# Patient Record
Sex: MALE | Race: WHITE | NOT HISPANIC OR LATINO | URBAN - METROPOLITAN AREA
[De-identification: names, ages, dates, MRNs, and addresses within clinical notes are randomized per-mention and may not be internally consistent; named-entity substitution may affect disease eponyms.]

---

## 2019-09-17 ENCOUNTER — EMERGENCY (EMERGENCY)
Facility: HOSPITAL | Age: 64
LOS: 1 days | Discharge: HOME | End: 2019-09-17
Attending: EMERGENCY MEDICINE | Admitting: EMERGENCY MEDICINE
Payer: COMMERCIAL

## 2019-09-17 VITALS
HEART RATE: 84 BPM | WEIGHT: 265 LBS | SYSTOLIC BLOOD PRESSURE: 166 MMHG | OXYGEN SATURATION: 98 % | RESPIRATION RATE: 20 BRPM | TEMPERATURE: 99 F | DIASTOLIC BLOOD PRESSURE: 93 MMHG

## 2019-09-17 VITALS
DIASTOLIC BLOOD PRESSURE: 86 MMHG | OXYGEN SATURATION: 99 % | HEART RATE: 85 BPM | SYSTOLIC BLOOD PRESSURE: 147 MMHG | TEMPERATURE: 98 F | RESPIRATION RATE: 16 BRPM

## 2019-09-17 DIAGNOSIS — Z98.890 OTHER SPECIFIED POSTPROCEDURAL STATES: Chronic | ICD-10-CM

## 2019-09-17 LAB
ALBUMIN SERPL ELPH-MCNC: 3.6 G/DL — SIGNIFICANT CHANGE UP (ref 3.3–5)
ALP SERPL-CCNC: 70 U/L — SIGNIFICANT CHANGE UP (ref 40–120)
ALT FLD-CCNC: 29 U/L — SIGNIFICANT CHANGE UP (ref 12–78)
ANION GAP SERPL CALC-SCNC: 5 MMOL/L — SIGNIFICANT CHANGE UP (ref 5–17)
APTT BLD: 36 SEC — SIGNIFICANT CHANGE UP (ref 28.5–37)
AST SERPL-CCNC: 18 U/L — SIGNIFICANT CHANGE UP (ref 15–37)
BASOPHILS # BLD AUTO: 0.02 K/UL — SIGNIFICANT CHANGE UP (ref 0–0.2)
BASOPHILS NFR BLD AUTO: 0.3 % — SIGNIFICANT CHANGE UP (ref 0–2)
BILIRUB SERPL-MCNC: 0.3 MG/DL — SIGNIFICANT CHANGE UP (ref 0.2–1.2)
BUN SERPL-MCNC: 17 MG/DL — SIGNIFICANT CHANGE UP (ref 7–23)
CALCIUM SERPL-MCNC: 9.6 MG/DL — SIGNIFICANT CHANGE UP (ref 8.5–10.1)
CHLORIDE SERPL-SCNC: 108 MMOL/L — SIGNIFICANT CHANGE UP (ref 96–108)
CO2 SERPL-SCNC: 30 MMOL/L — SIGNIFICANT CHANGE UP (ref 22–31)
CREAT SERPL-MCNC: 1.1 MG/DL — SIGNIFICANT CHANGE UP (ref 0.5–1.3)
EOSINOPHIL # BLD AUTO: 0.08 K/UL — SIGNIFICANT CHANGE UP (ref 0–0.5)
EOSINOPHIL NFR BLD AUTO: 1.1 % — SIGNIFICANT CHANGE UP (ref 0–6)
GLUCOSE SERPL-MCNC: 101 MG/DL — HIGH (ref 70–99)
HCT VFR BLD CALC: 45.9 % — SIGNIFICANT CHANGE UP (ref 39–50)
HGB BLD-MCNC: 15.6 G/DL — SIGNIFICANT CHANGE UP (ref 13–17)
IMM GRANULOCYTES NFR BLD AUTO: 0.4 % — SIGNIFICANT CHANGE UP (ref 0–1.5)
INR BLD: 1.09 RATIO — SIGNIFICANT CHANGE UP (ref 0.88–1.16)
LIDOCAIN IGE QN: 68 U/L — LOW (ref 73–393)
LYMPHOCYTES # BLD AUTO: 1.52 K/UL — SIGNIFICANT CHANGE UP (ref 1–3.3)
LYMPHOCYTES # BLD AUTO: 20.3 % — SIGNIFICANT CHANGE UP (ref 13–44)
MCHC RBC-ENTMCNC: 29.7 PG — SIGNIFICANT CHANGE UP (ref 27–34)
MCHC RBC-ENTMCNC: 34 GM/DL — SIGNIFICANT CHANGE UP (ref 32–36)
MCV RBC AUTO: 87.4 FL — SIGNIFICANT CHANGE UP (ref 80–100)
MONOCYTES # BLD AUTO: 0.37 K/UL — SIGNIFICANT CHANGE UP (ref 0–0.9)
MONOCYTES NFR BLD AUTO: 5 % — SIGNIFICANT CHANGE UP (ref 2–14)
NEUTROPHILS # BLD AUTO: 5.45 K/UL — SIGNIFICANT CHANGE UP (ref 1.8–7.4)
NEUTROPHILS NFR BLD AUTO: 72.9 % — SIGNIFICANT CHANGE UP (ref 43–77)
NRBC # BLD: 0 /100 WBCS — SIGNIFICANT CHANGE UP (ref 0–0)
NT-PROBNP SERPL-SCNC: 63 PG/ML — SIGNIFICANT CHANGE UP (ref 0–125)
PLATELET # BLD AUTO: 243 K/UL — SIGNIFICANT CHANGE UP (ref 150–400)
POTASSIUM SERPL-MCNC: 4.7 MMOL/L — SIGNIFICANT CHANGE UP (ref 3.5–5.3)
POTASSIUM SERPL-SCNC: 4.7 MMOL/L — SIGNIFICANT CHANGE UP (ref 3.5–5.3)
PROT SERPL-MCNC: 6.9 G/DL — SIGNIFICANT CHANGE UP (ref 6–8.3)
PROTHROM AB SERPL-ACNC: 12.4 SEC — SIGNIFICANT CHANGE UP (ref 10–12.9)
RBC # BLD: 5.25 M/UL — SIGNIFICANT CHANGE UP (ref 4.2–5.8)
RBC # FLD: 12.6 % — SIGNIFICANT CHANGE UP (ref 10.3–14.5)
SODIUM SERPL-SCNC: 143 MMOL/L — SIGNIFICANT CHANGE UP (ref 135–145)
TROPONIN I SERPL-MCNC: <.015 NG/ML — SIGNIFICANT CHANGE UP (ref 0.01–0.04)
TROPONIN I SERPL-MCNC: <.015 NG/ML — SIGNIFICANT CHANGE UP (ref 0.01–0.04)
TSH SERPL-MCNC: 2.23 UIU/ML — SIGNIFICANT CHANGE UP (ref 0.36–3.74)
WBC # BLD: 7.47 K/UL — SIGNIFICANT CHANGE UP (ref 3.8–10.5)
WBC # FLD AUTO: 7.47 K/UL — SIGNIFICANT CHANGE UP (ref 3.8–10.5)

## 2019-09-17 PROCEDURE — 99284 EMERGENCY DEPT VISIT MOD MDM: CPT | Mod: 25

## 2019-09-17 PROCEDURE — 36415 COLL VENOUS BLD VENIPUNCTURE: CPT

## 2019-09-17 PROCEDURE — 80053 COMPREHEN METABOLIC PANEL: CPT

## 2019-09-17 PROCEDURE — 99285 EMERGENCY DEPT VISIT HI MDM: CPT

## 2019-09-17 PROCEDURE — 85027 COMPLETE CBC AUTOMATED: CPT

## 2019-09-17 PROCEDURE — 71045 X-RAY EXAM CHEST 1 VIEW: CPT

## 2019-09-17 PROCEDURE — 83690 ASSAY OF LIPASE: CPT

## 2019-09-17 PROCEDURE — 83880 ASSAY OF NATRIURETIC PEPTIDE: CPT

## 2019-09-17 PROCEDURE — 93010 ELECTROCARDIOGRAM REPORT: CPT

## 2019-09-17 PROCEDURE — 85730 THROMBOPLASTIN TIME PARTIAL: CPT

## 2019-09-17 PROCEDURE — 99284 EMERGENCY DEPT VISIT MOD MDM: CPT

## 2019-09-17 PROCEDURE — 85610 PROTHROMBIN TIME: CPT

## 2019-09-17 PROCEDURE — 84443 ASSAY THYROID STIM HORMONE: CPT

## 2019-09-17 PROCEDURE — 84484 ASSAY OF TROPONIN QUANT: CPT

## 2019-09-17 PROCEDURE — 71045 X-RAY EXAM CHEST 1 VIEW: CPT | Mod: 26

## 2019-09-17 PROCEDURE — 93005 ELECTROCARDIOGRAM TRACING: CPT

## 2019-09-17 NOTE — ED PROVIDER NOTE - NSFOLLOWUPINSTRUCTIONS_ED_ALL_ED_FT
1) Follow-up with your Primary Medical Doctor and Dr. Jason. Call today / next business day for prompt follow-up.  2) Return to Emergency room for any worsening or persistent pain, weakness, fever, or any other concerning symptoms.  3) See attached instruction sheets for additional information, including information regarding signs and symptoms to look out for, reasons to seek immediate care and other important instructions.  4) Follow-up with any specialists as discussed / noted as well.

## 2019-09-17 NOTE — ED PROVIDER NOTE - PATIENT PORTAL LINK FT
You can access the FollowMyHealth Patient Portal offered by [facility] by registering at the following website: [email or web address]. By joining [employer]’s FollowMyHealth portal, you will also be able to view your health information using other applications (apps) compatible with our system.

## 2019-09-17 NOTE — ED PROVIDER NOTE - PR
[facility] called. Referral number [id number]. Not an organ clint .  Call with cardiac time of death
194

## 2019-09-17 NOTE — CONSULT NOTE ADULT - ASSESSMENT
63 yo M w PMHX of hypertension and seizure complaining of cough. Patient says that today he felt short of breath and had chest pain that radiated to his throat. Pain was not related to activity and occurred at rest. States he had stress test done this year with normal results. Denies n/v/d/c, diaphoresis.     - Patient currently states his symptoms have resolved   - F/u CE   - EKG shows NSR   - Recommend outpatient evaluation for cardiac risk factor stratification.   - No meaningful evidence of volume overload.  - BP elevated. Recommend BP log at home and following up outpt for BP med optimization.   - Continue lisinopril 20mg for HTN   - Monitor and replete lytes, keep K>4, Mg>2.  - All other workup per primary team.  - Will continue to follow. 63 yo M w PMHX of hypertension, here with shortness of breath. Patient says that today he felt short of breath and had chest pain that radiated to his throat. Pain was not related to activity and occurred at rest. States he had stress test done this year with normal results. Denies n/v/d/c, diaphoresis.     - Patient currently states his symptoms have resolved   - Await CE and D-dimer, along with CXR  - EKG shows NSR without worrisome changes.  - No meaningful evidence of volume overload. He does report a cough, which could be related to his ace-inhibitor  - BP elevated, though he is anxious. Can continue with lisinopril for now and he will follow up with his PMD for further management.  - Send a second set of CE in 6 hours.  - If all above is negative, can plan for d/c home for further evaluation. He will need an echocardiogram

## 2019-09-17 NOTE — ED ADULT NURSE REASSESSMENT NOTE - NS ED NURSE REASSESS COMMENT FT1
Report taken from April RN. Pt received alert and oriented x 4 . denies any pain or discomfort. Awaiting ce 2 to be drawn at 2200.

## 2019-09-17 NOTE — CONSULT NOTE ADULT - SUBJECTIVE AND OBJECTIVE BOX
[facility]         Alycia Harmon, Poppy, Lee, Eric, Jeramy, Eren        [phone] (office)    CHIEF COMPLAINT: Patient is a 64y old  Male who presents with a chief complaint of     HPI:  63 yo M w PMHX of hypertension and seizure complaining of cough. Patient says that today he felt short of breath and had chest pain that radiated to his throat. PMD suggested he should visit ED. Pain was not related to activity and occurred at rest. States he had stress test done this year with normal results. Denies n/v/d/c, diaphoresis.     PAST MEDICAL & SURGICAL HISTORY:  Seizures  Concussion  HTN (hypertension)  S/P brain surgery      SOCIAL HISTORY: No active tobacco, alcohol or illicit drug use.    FAMILY HISTORY:      Home Medications:  lamoTRIgine:  (17 Sep 2019 17:42)  levETIRAcetam:  (17 Sep 2019 17:42)  lisinopril:  (17 Sep 2019 17:42)  otford:  (17 Sep 2019 17:42)      MEDICATIONS  (STANDING):    MEDICATIONS  (PRN):      Allergies    No Known Allergies    Intolerances        REVIEW OF SYSTEMS: Is negative for eye, ENT, GI, , allergic, dermatologic, musculoskeletal and neurologic, except as described above.    VITAL SIGNS:   Vital Signs Last 24 Hrs  T(C): 37.2 (17 Sep 2019 17:37), Max: 37.2 (17 Sep 2019 17:37)  T(F): 98.9 (17 Sep 2019 17:37), Max: 98.9 (17 Sep 2019 17:37)  HR: 84 (17 Sep 2019 17:37) (84 - 84)  BP: 166/93 (17 Sep 2019 17:37) (166/93 - 166/93)  BP(mean): --  RR: 20 (17 Sep 2019 17:37) (20 - 20)  SpO2: 98% (17 Sep 2019 17:37) (98% - 98%)    I&O's Summary      PHYSICAL EXAM:  Constitutional: NAD, awake and alert, well-developed  Eyes: EOMI, no oral cyanosis, conjunctivae clear, anicteric  Pulmonary: Non-labored, breath sounds are clear bilaterally, no wheezing, rales or rhonchi  Cardiovascular: regular S1 and S2,normal rate. No murmur  Gastrointestinal: Bowel sounds present, soft, nontender  Lymph: No peripheral edema   Neurological: Alert, strength and sensitivity are grossly intact  Skin: Warm, well-perfused  Psych: Mood and affect appropriate    LABS: All Labs Reviewed:                        15.6   7.47  )-----------( 243      ( 17 Sep 2019 18:05 )             45.9                 Blood Culture:         RADIOLOGY:    EKG: [facility]         Alycia Harmon, Poppy, Lee, Eric, Jeramy, Eren        [phone] (office)    CHIEF COMPLAINT: Patient is a 64y old  Male who presents with a chief complaint of chest pain    HPI:  65 yo M w PMHX of hypertension and seizure complaining of cough. Patient says that today he felt short of breath and had some chest tightness, with difficulty taking a deep breath. He also felt that his heart was breathing quickly/out of his chest.  He has had an intermittent cough for a week, and was given a spray.  PMD suggested he should visit ED because of the breathing issue. Pain was not related to activity and occurred at rest. States he had stress test done this year with normal results. Denies n/v/d/c, diaphoresis.   He has a history of brain surgery and is on Lamictal and keppra. He has HTN and is on lisinopril 20.    PAST MEDICAL & SURGICAL HISTORY:  Seizures  Concussion  HTN (hypertension)  S/P brain surgery      SOCIAL HISTORY: No active tobacco, alcohol or illicit drug use.    FAMILY HISTORY:  + HTN but no cad or scd    Home Medications:  lamoTRIgine:  (17 Sep 2019 17:42)  levETIRAcetam:  (17 Sep 2019 17:42)  lisinopril:  (17 Sep 2019 17:42)  otford:  (17 Sep 2019 17:42)      MEDICATIONS  (STANDING):    MEDICATIONS  (PRN):      Allergies    No Known Allergies    Intolerances        REVIEW OF SYSTEMS: Is negative for eye, ENT, GI, , allergic, dermatologic, musculoskeletal and neurologic, except as described above.    VITAL SIGNS:   Vital Signs Last 24 Hrs  T(C): 37.2 (17 Sep 2019 17:37), Max: 37.2 (17 Sep 2019 17:37)  T(F): 98.9 (17 Sep 2019 17:37), Max: 98.9 (17 Sep 2019 17:37)  HR: 84 (17 Sep 2019 17:37) (84 - 84)  BP: 166/93 (17 Sep 2019 17:37) (166/93 - 166/93)  BP(mean): --  RR: 20 (17 Sep 2019 17:37) (20 - 20)  SpO2: 98% (17 Sep 2019 17:37) (98% - 98%)    I&O's Summary      PHYSICAL EXAM:  Constitutional: NAD, awake and alert, well-developed  Eyes: EOMI, no oral cyanosis, conjunctivae clear, anicteric  Pulmonary: Non-labored, breath sounds are clear bilaterally, no wheezing, rales or rhonchi  Cardiovascular: regular S1 and S2,normal rate. No murmur  Gastrointestinal: Bowel sounds present, soft, nontender  Lymph: No peripheral edema   Neurological: Alert, strength and sensitivity are grossly intact  Skin: Warm, well-perfused  Psych: Mood and affect appropriate    LABS: All Labs Reviewed:                        15.6   7.47  )-----------( 243      ( 17 Sep 2019 18:05 )             45.9                 Blood Culture:         RADIOLOGY:    EKG: SR

## 2019-09-17 NOTE — ED PROVIDER NOTE - CARE PROVIDER_API CALL
Osman Jason)  Internal Medicine  [address], [place] [id number]  Phone: [phone]  Fax: [phone]  Follow Up Time:

## 2019-09-17 NOTE — ED PROVIDER NOTE - NS ED ROS FT
Constitutional: - Fever, - Chills, - Anorexia, - Fatigue, - Night sweats  Eyes: - Discharge, - Irritation, - Redness, - Visual changes, - Light sensitivity, - Pain  EARS: - Ear Pain, - Tinnitus, - Decreased hearing  NOSE: - Congestion, - Bloody nose  MOUTH/THROAT: - Vocal Changes, - Drooling, - Sore throat  NECK: - Lumps, - Stiffness, - Pain  CV: + Palpitations, + Chest Pain, - Edema, - Syncope  RESP:  - Cough, + Shortness of Breath, - Dyspnea on Exertion, - Trouble speaking, - Pleuritic pain - Wheezing  GI: - Diarrhea, - Constipation, - Bloody stools, - Nausea, - Vomiting, - Abdominal Pain  : - Dysuria, -Frequency, - Hematuria, - Hesitancy, - Incontinence, - Saddle Anesthesia, - Abnormal discharge  MSK: - Myalgias, - Arthralgias, - Weakness, - Deformities, - Injuries  SKIN: - Color change, - Rash, - Swelling, - Ecchymosis, - Abrasion, - laceration  NEURO: - Change in behavior, - Dec. Alertness, - Headache, - Dizziness, - Change in speech, - Weakness, - Seizure-like activity, - Difficulty ambulating

## 2019-09-17 NOTE — ED PROVIDER NOTE - OBJECTIVE STATEMENT
63 yo male hx of htn, seizures, c/o palpitations, chest pain, shortness of breath since yesterday night, occurred while at rest, radiating to throat, moderate, nonexertional, no medications taken for this. Was told by his PMD Dr. Kaur to go to ED for evaluation.

## 2020-03-31 NOTE — ED ADULT NURSE NOTE - CHPI ED NUR SYMPTOMS POS
Implemented All Universal Safety Interventions:  [place] to call system. Call bell, personal items and telephone within reach. Instruct patient to call for assistance. Room bathroom lighting operational. Non-slip footwear when patient is off stretcher. Physically safe environment: no spills, clutter or unnecessary equipment. Stretcher in lowest position, wheels locked, appropriate side rails in place.
PALPITATIONS

## 2021-05-17 PROBLEM — I10 ESSENTIAL (PRIMARY) HYPERTENSION: Chronic | Status: ACTIVE | Noted: 2019-09-17

## 2021-05-18 PROBLEM — Z00.00 ENCOUNTER FOR PREVENTIVE HEALTH EXAMINATION: Status: ACTIVE | Noted: 2021-05-18

## 2021-05-19 ENCOUNTER — APPOINTMENT (OUTPATIENT)
Dept: ELECTROPHYSIOLOGY | Facility: CLINIC | Age: 66
End: 2021-05-19
Payer: MEDICARE

## 2021-05-19 VITALS
WEIGHT: 256 LBS | DIASTOLIC BLOOD PRESSURE: 76 MMHG | SYSTOLIC BLOOD PRESSURE: 134 MMHG | BODY MASS INDEX: 36.65 KG/M2 | HEIGHT: 70 IN | HEART RATE: 60 BPM

## 2021-05-19 DIAGNOSIS — I48.91 UNSPECIFIED ATRIAL FIBRILLATION: ICD-10-CM

## 2021-05-19 PROCEDURE — 93000 ELECTROCARDIOGRAM COMPLETE: CPT

## 2021-05-19 PROCEDURE — 99205 OFFICE O/P NEW HI 60 MIN: CPT

## 2021-08-14 ENCOUNTER — APPOINTMENT (OUTPATIENT)
Dept: DISASTER EMERGENCY | Facility: CLINIC | Age: 66
End: 2021-08-14

## 2021-08-14 DIAGNOSIS — Z01.818 ENCOUNTER FOR OTHER PREPROCEDURAL EXAMINATION: ICD-10-CM

## 2021-08-14 LAB — SARS-COV-2 N GENE NPH QL NAA+PROBE: DETECTED

## 2021-10-13 ENCOUNTER — OUTPATIENT (OUTPATIENT)
Dept: OUTPATIENT SERVICES | Facility: HOSPITAL | Age: 66
LOS: 1 days | End: 2021-10-13
Payer: MEDICARE

## 2021-10-13 VITALS
DIASTOLIC BLOOD PRESSURE: 70 MMHG | TEMPERATURE: 98 F | WEIGHT: 255.74 LBS | SYSTOLIC BLOOD PRESSURE: 130 MMHG | HEART RATE: 58 BPM | HEIGHT: 70.25 IN | OXYGEN SATURATION: 98 % | RESPIRATION RATE: 20 BRPM

## 2021-10-13 DIAGNOSIS — Z98.890 OTHER SPECIFIED POSTPROCEDURAL STATES: Chronic | ICD-10-CM

## 2021-10-13 DIAGNOSIS — Z29.9 ENCOUNTER FOR PROPHYLACTIC MEASURES, UNSPECIFIED: ICD-10-CM

## 2021-10-13 DIAGNOSIS — Z01.818 ENCOUNTER FOR OTHER PREPROCEDURAL EXAMINATION: ICD-10-CM

## 2021-10-13 DIAGNOSIS — I48.91 UNSPECIFIED ATRIAL FIBRILLATION: ICD-10-CM

## 2021-10-13 DIAGNOSIS — Z90.79 ACQUIRED ABSENCE OF OTHER GENITAL ORGAN(S): Chronic | ICD-10-CM

## 2021-10-13 LAB
ALBUMIN SERPL ELPH-MCNC: 4.6 G/DL — SIGNIFICANT CHANGE UP (ref 3.3–5.2)
ALP SERPL-CCNC: 93 U/L — SIGNIFICANT CHANGE UP (ref 40–120)
ALT FLD-CCNC: 22 U/L — SIGNIFICANT CHANGE UP
ANION GAP SERPL CALC-SCNC: 10 MMOL/L — SIGNIFICANT CHANGE UP (ref 5–17)
APTT BLD: 39.2 SEC — HIGH (ref 27.5–35.5)
AST SERPL-CCNC: 22 U/L — SIGNIFICANT CHANGE UP
BASOPHILS # BLD AUTO: 0.02 K/UL — SIGNIFICANT CHANGE UP (ref 0–0.2)
BASOPHILS NFR BLD AUTO: 0.3 % — SIGNIFICANT CHANGE UP (ref 0–2)
BILIRUB SERPL-MCNC: 0.4 MG/DL — SIGNIFICANT CHANGE UP (ref 0.4–2)
BLD GP AB SCN SERPL QL: SIGNIFICANT CHANGE UP
BUN SERPL-MCNC: 17.9 MG/DL — SIGNIFICANT CHANGE UP (ref 8–20)
CALCIUM SERPL-MCNC: 10.7 MG/DL — HIGH (ref 8.6–10.2)
CHLORIDE SERPL-SCNC: 102 MMOL/L — SIGNIFICANT CHANGE UP (ref 98–107)
CO2 SERPL-SCNC: 27 MMOL/L — SIGNIFICANT CHANGE UP (ref 22–29)
CREAT SERPL-MCNC: 0.99 MG/DL — SIGNIFICANT CHANGE UP (ref 0.5–1.3)
EOSINOPHIL # BLD AUTO: 0.07 K/UL — SIGNIFICANT CHANGE UP (ref 0–0.5)
EOSINOPHIL NFR BLD AUTO: 1 % — SIGNIFICANT CHANGE UP (ref 0–6)
GLUCOSE SERPL-MCNC: 110 MG/DL — HIGH (ref 70–99)
HCT VFR BLD CALC: 44.5 % — SIGNIFICANT CHANGE UP (ref 39–50)
HGB BLD-MCNC: 14.8 G/DL — SIGNIFICANT CHANGE UP (ref 13–17)
IMM GRANULOCYTES NFR BLD AUTO: 0.1 % — SIGNIFICANT CHANGE UP (ref 0–1.5)
INR BLD: 1.15 RATIO — SIGNIFICANT CHANGE UP (ref 0.88–1.16)
LYMPHOCYTES # BLD AUTO: 1.44 K/UL — SIGNIFICANT CHANGE UP (ref 1–3.3)
LYMPHOCYTES # BLD AUTO: 20.1 % — SIGNIFICANT CHANGE UP (ref 13–44)
MAGNESIUM SERPL-MCNC: 2.2 MG/DL — SIGNIFICANT CHANGE UP (ref 1.8–2.6)
MCHC RBC-ENTMCNC: 29.2 PG — SIGNIFICANT CHANGE UP (ref 27–34)
MCHC RBC-ENTMCNC: 33.3 GM/DL — SIGNIFICANT CHANGE UP (ref 32–36)
MCV RBC AUTO: 87.8 FL — SIGNIFICANT CHANGE UP (ref 80–100)
MONOCYTES # BLD AUTO: 0.38 K/UL — SIGNIFICANT CHANGE UP (ref 0–0.9)
MONOCYTES NFR BLD AUTO: 5.3 % — SIGNIFICANT CHANGE UP (ref 2–14)
NEUTROPHILS # BLD AUTO: 5.26 K/UL — SIGNIFICANT CHANGE UP (ref 1.8–7.4)
NEUTROPHILS NFR BLD AUTO: 73.2 % — SIGNIFICANT CHANGE UP (ref 43–77)
PLATELET # BLD AUTO: 220 K/UL — SIGNIFICANT CHANGE UP (ref 150–400)
POTASSIUM SERPL-MCNC: 4.8 MMOL/L — SIGNIFICANT CHANGE UP (ref 3.5–5.3)
POTASSIUM SERPL-SCNC: 4.8 MMOL/L — SIGNIFICANT CHANGE UP (ref 3.5–5.3)
PROT SERPL-MCNC: 7.2 G/DL — SIGNIFICANT CHANGE UP (ref 6.6–8.7)
PROTHROM AB SERPL-ACNC: 13.2 SEC — SIGNIFICANT CHANGE UP (ref 10.6–13.6)
RBC # BLD: 5.07 M/UL — SIGNIFICANT CHANGE UP (ref 4.2–5.8)
RBC # FLD: 13.6 % — SIGNIFICANT CHANGE UP (ref 10.3–14.5)
SODIUM SERPL-SCNC: 139 MMOL/L — SIGNIFICANT CHANGE UP (ref 135–145)
WBC # BLD: 7.18 K/UL — SIGNIFICANT CHANGE UP (ref 3.8–10.5)
WBC # FLD AUTO: 7.18 K/UL — SIGNIFICANT CHANGE UP (ref 3.8–10.5)

## 2021-10-13 PROCEDURE — G0463: CPT

## 2021-10-13 PROCEDURE — 93010 ELECTROCARDIOGRAM REPORT: CPT

## 2021-10-13 PROCEDURE — 93005 ELECTROCARDIOGRAM TRACING: CPT

## 2021-10-13 NOTE — H&P PST ADULT - NSICDXPASTMEDICALHX_GEN_ALL_CORE_FT
PAST MEDICAL HISTORY:  Concussion     HTN (hypertension)     Seizures      PAST MEDICAL HISTORY:  Concussion childhood    HTN (hypertension)     Prostate cancer     Seizures last seizure 3 months ago    Unspecified atrial fibrillation

## 2021-10-13 NOTE — H&P PST ADULT - ASSESSMENT
67 yo    Plan:   Labs pending.   Pre-procedure instructions provided (verbal & written) as follows:  Ablation 10/22/2021    - Last dose Xarelto 10/21/2021 PM (NO a/c day of ablation)   - NPO after midnight prior except meds w/sips of water.    - Hold the following medications the morning of the procedure: ***Check MD note for possible instructions re: holding antiarrhythmic medications***    Pt was educated on preop preparation with written and verbal instructions. Pt will review medications with PCP. Pt was educated on NSAIDs, multivitamins and herbals that increase the risk of bleeding and need to be stopped 7 days before procedure. Pt was educated on covid testing and covid prevention, i.e. social distancing, handwashing, mask wearing. Pt verbalized understanding of the above.     OPIOID RISK TOOL    FRANKLIN EACH BOX THAT APPLIES AND ADD TOTALS AT THE END    FAMILY HISTORY OF SUBSTANCE ABUSE                 FEMALE         MALE                                                Alcohol                             [  ]1 pt          [  ]3pts                                               Illegal Durgs                     [  ]2 pts        [  ]3pts                                               Rx Drugs                           [  ]4 pts        [  ]4 pts    PERSONAL HISTORY OF SUBSTANCE ABUSE                                                                                          Alcohol                             [  ]3 pts       [  ]3 pts                                               Illegal Drugs                     [  ]4 pts        [  ]4 pts                                               Rx Drugs                           [  ]5 pts        [  ]5 pts    AGE BETWEEN 16-45 YEARS                                      [  ]1 pt         [  ]1 pt    HISTORY OF PREADOLESCENT   SEXUAL ABUSE                                                             [  ]3 pts        [  ]0pts    PSYCHOLOGICAL DISEASE                     ADD, OCD, Bipolar, Schizophrenia        [  ]2 pts         [  ]2 pts                      Depression                                               [  ]1 pt           [  ]1 pt           SCORING TOTAL   (add numbers and type here)              (***)                                     A score of 3 or lower indicated LOW risk for future opioid abuse  A score of 4 to 7 indicated moderate risk for future opioid abuse  A score of 8 or higher indicates a high risk for opioid abuse    CAPRINI VTE 2.0 SCORE [CLOT updated 2019]    AGE RELATED RISK FACTORS                                                       MOBILITY RELATED FACTORS  [ ] Age 41-60 years                                            (1 Point)                    [ ] Bed rest                                                        (1 Point)  [ ] Age: 61-74 years                                           (2 Points)                  [ ] Plaster cast                                                   (2 Points)  [ ] Age= 75 years                                              (3 Points)                    [ ] Bed bound for more than 72 hours                 (2 Points)    DISEASE RELATED RISK FACTORS                                               GENDER SPECIFIC FACTORS  [ ] Edema in the lower extremities                       (1 Point)              [ ] Pregnancy                                                     (1 Point)  [ ] Varicose veins                                               (1 Point)                     [ ] Post-partum < 6 weeks                                   (1 Point)             [ ] BMI > 25 Kg/m2                                            (1 Point)                     [ ] Hormonal therapy  or oral contraception          (1 Point)                 [ ] Sepsis (in the previous month)                        (1 Point)               [ ] History of pregnancy complications                 (1 point)  [ ] Pneumonia or serious lung disease                                               [ ] Unexplained or recurrent                     (1 Point)           (in the previous month)                               (1 Point)  [ ] Abnormal pulmonary function test                     (1 Point)                 SURGERY RELATED RISK FACTORS  [ ] Acute myocardial infarction                              (1 Point)               [ ]  Section                                             (1 Point)  [ ] Congestive heart failure (in the previous month)  (1 Point)      [ ] Minor surgery                                                  (1 Point)   [ ] Inflammatory bowel disease                             (1 Point)               [ ] Arthroscopic surgery                                        (2 Points)  [ ] Central venous access                                      (2 Points)                [ ] General surgery lasting more than 45 minutes (2 points)  [ ] Malignancy- Present or previous                   (2 Points)                [ ] Elective arthroplasty                                         (5 points)    [ ] Stroke (in the previous month)                          (5 Points)                                                                                                                                                           HEMATOLOGY RELATED FACTORS                                                 TRAUMA RELATED RISK FACTORS  [ ] Prior episodes of VTE                                     (3 Points)                [ ] Fracture of the hip, pelvis, or leg                       (5 Points)  [ ] Positive family history for VTE                         (3 Points)             [ ] Acute spinal cord injury (in the previous month)  (5 Points)  [ ] Prothrombin 14732 A                                     (3 Points)               [ ] Paralysis  (less than 1 month)                             (5 Points)  [ ] Factor V Leiden                                             (3 Points)                  [ ] Multiple Trauma within 1 month                        (5 Points)  [ ] Lupus anticoagulants                                     (3 Points)                                                           [ ] Anticardiolipin antibodies                               (3 Points)                                                       [ ] High homocysteine in the blood                      (3 Points)                                             [ ] Other congenital or acquired thrombophilia      (3 Points)                                                [ ] Heparin induced thrombocytopenia                  (3 Points)                                     Total Score [          ] 65 yo M PMH of paroxysmal AF, prostate cancer, seizure disorder, he has a history of periodic palpitation, and ultimately found to have paroxysmal atrial fibrillation with rapid rates. Recently he has had much more frequent palpitation and longer lasting episodes. With this, he has felt dyspnea, LE edema, and lightheadedness with occasional near syncope. Recently he has also developed a new cough in the setting of mor dyspnea, and has noted progressive LE edema (LLE>>RLE). An event monitor 3/23/21- 21 revealed frequent episodes of AF with rapid rates (9% burden) with avg rate 93 bpm but up to 177 bpm. The longest AF episodes lasted 4 hours and 45 mins. He has been taking ASA 81 mg qd, and has been started on rate control with Toprol 50 mg qd,. He is also on antiepileptic medication with levetiracetam. TTE in 2021 revealed normal LV function. Patient now presents in anticipation of elective radiofrequency ablation of atrial fibrillation/CAIO w/Dr Wiseman on 10/22/2021     Plan:   Labs pending.   Pre-procedure instructions provided (verbal & written) as follows:  Ablation 10/22/2021    - Last dose Xarelto 10/21/2021 PM (NO a/c day of ablation)   - NPO after midnight prior except meds w/sips of water.    - Hold the following medications the morning of the procedure: multivitamins    Pt was educated on preop preparation with written and verbal instructions. Pt will review medications with PCP. Pt was educated on NSAIDs, multivitamins and herbals that increase the risk of bleeding and need to be stopped 7 days before procedure. Pt was educated on covid testing and covid prevention, i.e. social distancing, handwashing, mask wearing. Pt verbalized understanding of the above.     OPIOID RISK TOOL    FRANKLIN EACH BOX THAT APPLIES AND ADD TOTALS AT THE END    FAMILY HISTORY OF SUBSTANCE ABUSE                 FEMALE         MALE                                                Alcohol                             [  ]1 pt          [  ]3pts                                               Illegal Durgs                     [  ]2 pts        [  ]3pts                                               Rx Drugs                           [  ]4 pts        [  ]4 pts    PERSONAL HISTORY OF SUBSTANCE ABUSE                                                                                          Alcohol                             [  ]3 pts       [  ]3 pts                                               Illegal Drugs                     [  ]4 pts        [  ]4 pts                                               Rx Drugs                           [  ]5 pts        [  ]5 pts    AGE BETWEEN 16-45 YEARS                                      [  ]1 pt         [  ]1 pt    HISTORY OF PREADOLESCENT   SEXUAL ABUSE                                                             [  ]3 pts        [  ]0pts    PSYCHOLOGICAL DISEASE                     ADD, OCD, Bipolar, Schizophrenia        [  ]2 pts         [  ]2 pts                      Depression                                               [  ]1 pt           [  ]1 pt           SCORING TOTAL   (add numbers and type here)              (0)                                     A score of 3 or lower indicated LOW risk for future opioid abuse  A score of 4 to 7 indicated moderate risk for future opioid abuse  A score of 8 or higher indicates a high risk for opioid abuse    CAPRINI VTE 2.0 SCORE [CLOT updated 2019]    AGE RELATED RISK FACTORS                                                       MOBILITY RELATED FACTORS  [ ] Age 41-60 years                                            (1 Point)                    [ ] Bed rest                                                        (1 Point)  [x ] Age: 61-74 years                                           (2 Points)                  [ ] Plaster cast                                                   (2 Points)  [ ] Age= 75 years                                              (3 Points)                    [ ] Bed bound for more than 72 hours                 (2 Points)    DISEASE RELATED RISK FACTORS                                               GENDER SPECIFIC FACTORS  [x ] Edema in the lower extremities                       (1 Point)              [ ] Pregnancy                                                     (1 Point)  [ ] Varicose veins                                               (1 Point)                     [ ] Post-partum < 6 weeks                                   (1 Point)             [x ] BMI > 25 Kg/m2                                            (1 Point)                     [ ] Hormonal therapy  or oral contraception          (1 Point)                 [ ] Sepsis (in the previous month)                        (1 Point)               [ ] History of pregnancy complications                 (1 point)  [ ] Pneumonia or serious lung disease                                               [ ] Unexplained or recurrent                     (1 Point)           (in the previous month)                               (1 Point)  [ ] Abnormal pulmonary function test                     (1 Point)                 SURGERY RELATED RISK FACTORS  [ ] Acute myocardial infarction                              (1 Point)               [ ]  Section                                             (1 Point)  [ ] Congestive heart failure (in the previous month)  (1 Point)      [ ] Minor surgery                                                  (1 Point)   [ ] Inflammatory bowel disease                             (1 Point)               [ ] Arthroscopic surgery                                        (2 Points)  [ ] Central venous access                                      (2 Points)                [x ] General surgery lasting more than 45 minutes (2 points)  [x ] Malignancy- Present or previous                   (2 Points)                [ ] Elective arthroplasty                                         (5 points)    [ ] Stroke (in the previous month)                          (5 Points)                                                                                                                                                           HEMATOLOGY RELATED FACTORS                                                 TRAUMA RELATED RISK FACTORS  [ ] Prior episodes of VTE                                     (3 Points)                [ ] Fracture of the hip, pelvis, or leg                       (5 Points)  [ ] Positive family history for VTE                         (3 Points)             [ ] Acute spinal cord injury (in the previous month)  (5 Points)  [ ] Prothrombin 68557 A                                     (3 Points)               [ ] Paralysis  (less than 1 month)                             (5 Points)  [ ] Factor V Leiden                                             (3 Points)                  [ ] Multiple Trauma within 1 month                        (5 Points)  [ ] Lupus anticoagulants                                     (3 Points)                                                           [ ] Anticardiolipin antibodies                               (3 Points)                                                       [ ] High homocysteine in the blood                      (3 Points)                                             [ ] Other congenital or acquired thrombophilia      (3 Points)                                                [ ] Heparin induced thrombocytopenia                  (3 Points)                                     Total Score [    8      ]

## 2021-10-13 NOTE — H&P PST ADULT - HISTORY OF PRESENT ILLNESS
67 yo M PMH of paroxysmal AF, prostate cancer, seizure disorder, he has a history of periodic palpitation, and ultimately found to have paroxysmal atrial fibrillation with rapid rates. Recently he has had much more frequent palpitation and longer lasting episodes. With this, he has felt dyspnea, LE edema, and lightheadedness with occasional near syncope. Recently he has also developed a new cough in the setting of mor dyspnea, and has noted progressive LE edema (LLE>>RLE). An event monitor 3/23/21- 21 revealed frequent episodes of AF with rapid rates (9% burden) with avg rate 93 bpm but up to 177 bpm. The longest AF episodes lasted 4 hours and 45 mins. He has been taking ASA 81 mg qd, and has been started on rate control with Toprol 50 mg qd,. He is also on antiepileptic medication with levetiracetam. TTE in 2021 revealed normal LV function. Patient now presents in anticipation of elective radiofrequency ablation of atrial fibrillation/CAIO w/Dr Wiseman on 10/22/2021     Cardiology Summary    EC21 sinus rhythm 60 bpm, first degree AVB  ms, narrow QRS   Stress Test: ETT 19 neg for ischemia, or induced arrhythmia   Echo: TTE 21 LVEF 70%, LA 4.1cm, trace MR, trace TR, RVSP 32      65 yo M PMH of paroxysmal AF, prostate cancer, seizure disorder, he has a history of periodic palpitation, and ultimately found to have paroxysmal atrial fibrillation with rapid rates. Recently he has had much more frequent palpitation and longer lasting episodes. With this, he has felt dyspnea, LE edema, and lightheadedness with occasional near syncope. Recently he has also developed a new cough in the setting of mor dyspnea, and has noted progressive LE edema (LLE>>RLE). An event monitor 3/23/21- 21 revealed frequent episodes of AF with rapid rates (9% burden) with avg rate 93 bpm but up to 177 bpm. The longest AF episodes lasted 4 hours and 45 mins. He has been taking ASA 81 mg qd, and has been started on rate control with Toprol 50 mg qd,. He is also on antiepileptic medication with levetiracetam. TTE in 2021 revealed normal LV function. Patient now presents in anticipation of elective radiofrequency ablation of atrial fibrillation/CAIO w/Dr Wiseman on 10/22/2021   Afib diagnosed after prostate surgery 2021    Cardiology Summary    EC21 sinus rhythm 60 bpm, first degree AVB  ms, narrow QRS   Stress Test: ETT 19 neg for ischemia, or induced arrhythmia   Echo: TTE 21 LVEF 70%, LA 4.1cm, trace MR, trace TR, RVSP 32      65 yo M PMH of paroxysmal AF, prostate cancer, seizure disorder, he has a history of periodic palpitation, and ultimately found to have paroxysmal atrial fibrillation with rapid rates. Recently he has had much more frequent palpitation and longer lasting episodes. With this, he has felt dyspnea, LE edema, and lightheadedness with occasional near syncope. Recently he has also developed a new cough in the setting of mor dyspnea, and has noted progressive LE edema (LLE>>RLE). An event monitor 3/23/21- 21 revealed frequent episodes of AF with rapid rates (9% burden) with avg rate 93 bpm but up to 177 bpm. The longest AF episodes lasted 4 hours and 45 mins. He has been taking ASA 81 mg qd, and has been started on rate control with Toprol 50 mg qd,. He is also on antiepileptic medication with levetiracetam. TTE in 2021 revealed normal LV function. Patient now presents in anticipation of elective radiofrequency ablation of atrial fibrillation/CAIO w/Dr Wiseman on 10/22/2021   Afib diagnosed after prostate surgery 2021    Cardiology Summary    EC21 sinus rhythm 60 bpm, first degree AVB  ms, narrow QRS   Stress Test: ETT 19 neg for ischemia, or induced arrhythmia   Echo: TTE 21 LVEF 70%, LA 4.1cm, trace MR, trace TR, RVSP 32     Completed Covid 19 vaccination: J&J x1 (21)

## 2021-10-13 NOTE — H&P PST ADULT - RS GEN PE MLT RESP DETAILS PC
breath sounds equal/good air movement/respirations non-labored/clear to auscultation bilaterally/no intercostal retractions

## 2021-10-13 NOTE — H&P PST ADULT - NSICDXPASTSURGICALHX_GEN_ALL_CORE_FT
PAST SURGICAL HISTORY:  S/P brain surgery      PAST SURGICAL HISTORY:  H/O prostatectomy 01/2021    S/P brain surgery 1990

## 2021-10-13 NOTE — H&P PST ADULT - NSANTHOSAYNRD_GEN_A_CORE
No. JUANCARLOS screening performed.  STOP BANG Legend: 0-2 = LOW Risk; 3-4 = INTERMEDIATE Risk; 5-8 = HIGH Risk

## 2021-10-19 ENCOUNTER — APPOINTMENT (OUTPATIENT)
Dept: DISASTER EMERGENCY | Facility: CLINIC | Age: 66
End: 2021-10-19

## 2021-10-20 LAB — SARS-COV-2 N GENE NPH QL NAA+PROBE: NOT DETECTED

## 2021-10-22 ENCOUNTER — INPATIENT (INPATIENT)
Facility: HOSPITAL | Age: 66
LOS: 0 days | Discharge: HOME | DRG: 274 | End: 2021-10-23
Attending: STUDENT IN AN ORGANIZED HEALTH CARE EDUCATION/TRAINING PROGRAM | Admitting: STUDENT IN AN ORGANIZED HEALTH CARE EDUCATION/TRAINING PROGRAM
Payer: MEDICARE

## 2021-10-22 ENCOUNTER — TRANSCRIPTION ENCOUNTER (OUTPATIENT)
Age: 66
End: 2021-10-22

## 2021-10-22 VITALS
HEIGHT: 70 IN | RESPIRATION RATE: 18 BRPM | WEIGHT: 255.52 LBS | OXYGEN SATURATION: 98 % | TEMPERATURE: 97 F | DIASTOLIC BLOOD PRESSURE: 65 MMHG | HEART RATE: 63 BPM | SYSTOLIC BLOOD PRESSURE: 138 MMHG

## 2021-10-22 DIAGNOSIS — I48.91 UNSPECIFIED ATRIAL FIBRILLATION: ICD-10-CM

## 2021-10-22 DIAGNOSIS — Z98.890 OTHER SPECIFIED POSTPROCEDURAL STATES: Chronic | ICD-10-CM

## 2021-10-22 DIAGNOSIS — Z90.79 ACQUIRED ABSENCE OF OTHER GENITAL ORGAN(S): Chronic | ICD-10-CM

## 2021-10-22 LAB — ABO RH CONFIRMATION: SIGNIFICANT CHANGE UP

## 2021-10-22 PROCEDURE — 93662 INTRACARDIAC ECG (ICE): CPT | Mod: 26

## 2021-10-22 PROCEDURE — 93656 COMPRE EP EVAL ABLTJ ATR FIB: CPT

## 2021-10-22 PROCEDURE — 93623 PRGRMD STIMJ&PACG IV RX NFS: CPT | Mod: 26

## 2021-10-22 PROCEDURE — 93010 ELECTROCARDIOGRAM REPORT: CPT

## 2021-10-22 PROCEDURE — 93613 INTRACARDIAC EPHYS 3D MAPG: CPT

## 2021-10-22 PROCEDURE — 93655 ICAR CATH ABLTJ DSCRT ARRHYT: CPT

## 2021-10-22 RX ORDER — RIVAROXABAN 15 MG-20MG
20 KIT ORAL DAILY
Refills: 0 | Status: DISCONTINUED | OUTPATIENT
Start: 2021-10-22 | End: 2021-10-23

## 2021-10-22 RX ORDER — OXYCODONE AND ACETAMINOPHEN 5; 325 MG/1; MG/1
1 TABLET ORAL EVERY 6 HOURS
Refills: 0 | Status: DISCONTINUED | OUTPATIENT
Start: 2021-10-22 | End: 2021-10-23

## 2021-10-22 RX ORDER — SUCRALFATE 1 G
1 TABLET ORAL
Refills: 0 | Status: DISCONTINUED | OUTPATIENT
Start: 2021-10-22 | End: 2021-10-23

## 2021-10-22 RX ORDER — FUROSEMIDE 40 MG
20 TABLET ORAL DAILY
Refills: 0 | Status: DISCONTINUED | OUTPATIENT
Start: 2021-10-22 | End: 2021-10-23

## 2021-10-22 RX ORDER — LEVETIRACETAM 250 MG/1
1500 TABLET, FILM COATED ORAL
Refills: 0 | Status: DISCONTINUED | OUTPATIENT
Start: 2021-10-22 | End: 2021-10-23

## 2021-10-22 RX ORDER — LEVETIRACETAM 250 MG/1
0 TABLET, FILM COATED ORAL
Qty: 0 | Refills: 0 | DISCHARGE

## 2021-10-22 RX ORDER — ACETAMINOPHEN 500 MG
650 TABLET ORAL EVERY 6 HOURS
Refills: 0 | Status: DISCONTINUED | OUTPATIENT
Start: 2021-10-22 | End: 2021-10-23

## 2021-10-22 RX ORDER — METOPROLOL TARTRATE 50 MG
50 TABLET ORAL DAILY
Refills: 0 | Status: DISCONTINUED | OUTPATIENT
Start: 2021-10-22 | End: 2021-10-23

## 2021-10-22 RX ORDER — ALPRAZOLAM 0.25 MG
0.25 TABLET ORAL EVERY 6 HOURS
Refills: 0 | Status: DISCONTINUED | OUTPATIENT
Start: 2021-10-22 | End: 2021-10-23

## 2021-10-22 RX ORDER — FUROSEMIDE 40 MG
20 TABLET ORAL ONCE
Refills: 0 | Status: COMPLETED | OUTPATIENT
Start: 2021-10-22 | End: 2021-10-22

## 2021-10-22 RX ORDER — LISINOPRIL 2.5 MG/1
0 TABLET ORAL
Qty: 0 | Refills: 0 | DISCHARGE

## 2021-10-22 RX ORDER — LAMOTRIGINE 25 MG/1
100 TABLET, ORALLY DISINTEGRATING ORAL
Refills: 0 | Status: DISCONTINUED | OUTPATIENT
Start: 2021-10-22 | End: 2021-10-23

## 2021-10-22 RX ORDER — LAMOTRIGINE 25 MG/1
0 TABLET, ORALLY DISINTEGRATING ORAL
Qty: 0 | Refills: 0 | DISCHARGE

## 2021-10-22 RX ORDER — PANTOPRAZOLE SODIUM 20 MG/1
40 TABLET, DELAYED RELEASE ORAL
Refills: 0 | Status: DISCONTINUED | OUTPATIENT
Start: 2021-10-22 | End: 2021-10-23

## 2021-10-22 RX ORDER — LISINOPRIL 2.5 MG/1
20 TABLET ORAL DAILY
Refills: 0 | Status: DISCONTINUED | OUTPATIENT
Start: 2021-10-22 | End: 2021-10-23

## 2021-10-22 RX ORDER — BENZOCAINE AND MENTHOL 5; 1 G/100ML; G/100ML
1 LIQUID ORAL
Refills: 0 | Status: DISCONTINUED | OUTPATIENT
Start: 2021-10-22 | End: 2021-10-23

## 2021-10-22 RX ADMIN — LEVETIRACETAM 1500 MILLIGRAM(S): 250 TABLET, FILM COATED ORAL at 17:32

## 2021-10-22 RX ADMIN — Medication 1 GRAM(S): at 17:32

## 2021-10-22 RX ADMIN — PANTOPRAZOLE SODIUM 40 MILLIGRAM(S): 20 TABLET, DELAYED RELEASE ORAL at 17:32

## 2021-10-22 RX ADMIN — Medication 50 MILLIGRAM(S): at 17:32

## 2021-10-22 RX ADMIN — Medication 20 MILLIGRAM(S): at 16:11

## 2021-10-22 RX ADMIN — LAMOTRIGINE 100 MILLIGRAM(S): 25 TABLET, ORALLY DISINTEGRATING ORAL at 17:32

## 2021-10-22 RX ADMIN — RIVAROXABAN 20 MILLIGRAM(S): KIT at 16:11

## 2021-10-22 NOTE — DISCHARGE NOTE PROVIDER - CARE PROVIDERS DIRECT ADDRESSES
[email or web address]

## 2021-10-22 NOTE — PROGRESS NOTE ADULT - SUBJECTIVE AND OBJECTIVE BOX
66 year old male with PMH of HTN, prostate cancer s/p prostatectomy, seizure disorder and symptomatic paroxysmal atrial fibrillation. Event monitor 3/23-4/5/21 demonstrated frequent episodes of AF w/rapid rates, longest episode lasting 4 hours and 45 min. He presents today for elective radiofrequency ablation of atrial fib.   PST done on 10/13/21, denies any changes in his health since then. Labs reviewed.     - iv heplock  - confirmatory type and screen  - 2 units PRBC on hold   - consent w/Dr. Wiseman    66 year old male with PMH of HTN, prostate cancer s/p prostatectomy, seizure disorder and symptomatic paroxysmal atrial fibrillation. Event monitor 3/23-4/5/21 demonstrated frequent episodes of AF w/rapid rates, longest episode lasting 4 hours and 45 min. He presents today for elective radiofrequency ablation of atrial fib.   PST done on 10/13/21, denies any changes in his health since then. Labs reviewed.   Confirms NPO > 8 hrs, last dose Xarelto 10/21/21 PM.     - iv heplock  - confirmatory type and screen  - 2 units PRBC on hold   - consent w/Dr. Wiseman

## 2021-10-22 NOTE — PROGRESS NOTE ADULT - SUBJECTIVE AND OBJECTIVE BOX
PROCEDURE(S): Radiofrequency Ablation of Atrial Fibrillation    ELECTROPHYSIOLOGIST(S): Unruly Wiseman MD         COMPLICATIONS:  none        DISPOSITION: observation      CONDITION: stable    Pt doing well s/p elective radiofrequency atrial fibrillation ablation ([facility]ACA/PVI, CTI) via b/l femoral vein access.  Pt denies complaint post procedure.     MEDICATIONS  (STANDING):  furosemide    Tablet 20 milliGRAM(s) Oral daily  furosemide   Injectable 20 milliGRAM(s) IV Push once  lamoTRIgine 100 milliGRAM(s) Oral two times a day  levETIRAcetam 1500 milliGRAM(s) Oral two times a day  lisinopril 20 milliGRAM(s) Oral daily  metoprolol succinate ER 50 milliGRAM(s) Oral daily  pantoprazole    Tablet 40 milliGRAM(s) Oral two times a day  rivaroxaban 20 milliGRAM(s) Oral daily  sucralfate suspension 1 Gram(s) Oral two times a day    MEDICATIONS  (PRN):  acetaminophen     Tablet .. 650 milliGRAM(s) Oral every 6 hours PRN Mild Pain (1 - 3), Moderate Pain (4 - 6)  ALPRAZolam 0.25 milliGRAM(s) Oral every 6 hours PRN anxiety/insomnia  aluminum hydroxide/magnesium hydroxide/simethicone Suspension 30 milliLiter(s) Oral every 4 hours PRN Dyspepsia  benzocaine 15 mG/menthol 3.6 mG (Sugar-Free) Lozenge 1 Lozenge Oral every 2 hours PRN Sore Throat  oxycodone    5 mG/acetaminophen 325 mG 1 Tablet(s) Oral every 6 hours PRN Severe Pain (7 - 10)    Allergies:  No Known Allergies    VS:   T(C): 36.1 (10-22-21 @ 07:57), Max: 36.1 (10-22-21 @ 07:57)  HR: 73 (10-22-21 @ 12:05) (63 - 73)  BP: 113/57 (10-22-21 @ 12:05) (113/57 - 138/65)  RR: 18 (10-22-21 @ 12:05) (18 - 18)  SpO2: 98% (10-22-21 @ 12:05) (98% - 99%)  Post-procedure VS: /57 HR 72 O2 sat 99% RR 16     Physical exam:   NAD, A&O x 3  Card: S1/S2, RRR, no m/g/r  Resp: lungs CTA b/l  Abd: S/NT/ND  Groins: hemostatic sutures in place; sites C/D/I; no bleeding, hematoma, erythema, exudate or edema  Ext: no edema; distal pulses intact    CAIO: deferred due to strict compliance with AC    ECG: sinus rhythm 71 bpm     Assessment:   66 year old male with PMH of HTN, prostate cancer s/p prostatectomy, seizure disorder and symptomatic paroxysmal atrial fibrillation. Event monitor 3/23-4/5/21 demonstrated frequent episodes of AF w/rapid rates, longest episode lasting 4 hours and 45 min. He presented electively and is now status post uncomplicated radiofrequency ablation of atrial fibrillation (WACA/PVI, CTI). Resting comfortably.     Plan:   Bedrest x 4 hours, then OOB with assistance and progress as tolerated.   Groin sutures to be removed by EP service in AM.   Pending groin status: Xarelto 20mg today at 16:00  Start Protonix 40mg BID x 2 weeks then daily X 6 weeks.     Start Carafate 1gm BID x 2 weeks.   Continue other home medications.   Strict I/Os.  Please encourage incentive spirometry and ambulation once able.  Observation and monitoring on telemetry overnight with anticipated discharge in the AM and outpt follow up in 1 month.

## 2021-10-22 NOTE — DISCHARGE NOTE PROVIDER - NSDCFUADDINST_GEN_ALL_CORE_FT
Follow up with Dr. Wiseman in 3-4 weeks. Our office will contact you in 3-5 days to schedule this appointment. Please call [phone] with questions or concerns.

## 2021-10-22 NOTE — DISCHARGE NOTE PROVIDER - CARE PROVIDER_API CALL
Unruly Wiseman)  Cardiology; Internal Medicine  [address] [facility], [address] [place] [address]  Phone: [phone]  Fax: [phone]  Follow Up Time:

## 2021-10-22 NOTE — DISCHARGE NOTE PROVIDER - NSDCMRMEDTOKEN_GEN_ALL_CORE_FT
furosemide 20 mg oral tablet: 1 tab(s) orally once a day  lamoTRIgine 100 mg oral tablet: 1 tab(s) orally 2 times a day  levETIRAcetam 500 mg oral tablet: 3 tab(s) orally 2 times a day  lisinopril 20 mg oral tablet: 1 tab(s) orally once a day  metoprolol succinate 50 mg oral tablet, extended release: 1 tab(s) orally once a day  sildenafil 50 mg oral tablet: 1 tab(s) orally 2 times a day, As Needed  Xarelto 20 mg oral tablet: 1 tab(s) orally once a day (in the evening)   furosemide 20 mg oral tablet: 1 tab(s) orally once a day  lamoTRIgine 100 mg oral tablet: 1 tab(s) orally 2 times a day  levETIRAcetam 500 mg oral tablet: 3 tab(s) orally 2 times a day  lisinopril 20 mg oral tablet: 1 tab(s) orally once a day  metoprolol succinate 50 mg oral tablet, extended release: 1 tab(s) orally once a day  pantoprazole 40 mg oral delayed release tablet: 1 tab(s) orally 2 times a day x 14 days followed by 1 tab orally daily x 30 days  sildenafil 50 mg oral tablet: 1 tab(s) orally 2 times a day, As Needed  sucralfate 1 g/10 mL oral suspension: 10 milliliter(s) orally 2 times a day x 14 days   Xarelto 20 mg oral tablet: 1 tab(s) orally once a day (in the evening)

## 2021-10-22 NOTE — DISCHARGE NOTE PROVIDER - HOSPITAL COURSE
66 year old male with PMH of HTN, prostate cancer s/p prostatectomy, seizure disorder and symptomatic paroxysmal atrial fibrillation. Event monitor 3/23-4/5/21 demonstrated frequent episodes of AF w/rapid rates, longest episode lasting 4 hours and 45 min. He presented electively and is now status post uncomplicated radiofrequency ablation of atrial fibrillation (WACA/PVI, CTI).

## 2021-10-23 ENCOUNTER — TRANSCRIPTION ENCOUNTER (OUTPATIENT)
Age: 66
End: 2021-10-23

## 2021-10-23 VITALS
OXYGEN SATURATION: 98 % | TEMPERATURE: 98 F | HEART RATE: 61 BPM | RESPIRATION RATE: 15 BRPM | SYSTOLIC BLOOD PRESSURE: 117 MMHG | DIASTOLIC BLOOD PRESSURE: 64 MMHG

## 2021-10-23 LAB
ANION GAP SERPL CALC-SCNC: 11 MMOL/L — SIGNIFICANT CHANGE UP (ref 5–17)
BUN SERPL-MCNC: 15.3 MG/DL — SIGNIFICANT CHANGE UP (ref 8–20)
CALCIUM SERPL-MCNC: 9.9 MG/DL — SIGNIFICANT CHANGE UP (ref 8.6–10.2)
CHLORIDE SERPL-SCNC: 99 MMOL/L — SIGNIFICANT CHANGE UP (ref 98–107)
CO2 SERPL-SCNC: 29 MMOL/L — SIGNIFICANT CHANGE UP (ref 22–29)
CREAT SERPL-MCNC: 0.78 MG/DL — SIGNIFICANT CHANGE UP (ref 0.5–1.3)
GLUCOSE SERPL-MCNC: 117 MG/DL — HIGH (ref 70–99)
HCT VFR BLD CALC: 40.7 % — SIGNIFICANT CHANGE UP (ref 39–50)
HGB BLD-MCNC: 13.5 G/DL — SIGNIFICANT CHANGE UP (ref 13–17)
MAGNESIUM SERPL-MCNC: 2.1 MG/DL — SIGNIFICANT CHANGE UP (ref 1.6–2.6)
MCHC RBC-ENTMCNC: 29.1 PG — SIGNIFICANT CHANGE UP (ref 27–34)
MCHC RBC-ENTMCNC: 33.2 GM/DL — SIGNIFICANT CHANGE UP (ref 32–36)
MCV RBC AUTO: 87.7 FL — SIGNIFICANT CHANGE UP (ref 80–100)
PLATELET # BLD AUTO: 206 K/UL — SIGNIFICANT CHANGE UP (ref 150–400)
POTASSIUM SERPL-MCNC: 4 MMOL/L — SIGNIFICANT CHANGE UP (ref 3.5–5.3)
POTASSIUM SERPL-SCNC: 4 MMOL/L — SIGNIFICANT CHANGE UP (ref 3.5–5.3)
RBC # BLD: 4.64 M/UL — SIGNIFICANT CHANGE UP (ref 4.2–5.8)
RBC # FLD: 13.6 % — SIGNIFICANT CHANGE UP (ref 10.3–14.5)
SODIUM SERPL-SCNC: 139 MMOL/L — SIGNIFICANT CHANGE UP (ref 135–145)
WBC # BLD: 9.67 K/UL — SIGNIFICANT CHANGE UP (ref 3.8–10.5)
WBC # FLD AUTO: 9.67 K/UL — SIGNIFICANT CHANGE UP (ref 3.8–10.5)

## 2021-10-23 PROCEDURE — 36415 COLL VENOUS BLD VENIPUNCTURE: CPT

## 2021-10-23 PROCEDURE — 93656 COMPRE EP EVAL ABLTJ ATR FIB: CPT

## 2021-10-23 PROCEDURE — 93613 INTRACARDIAC EPHYS 3D MAPG: CPT

## 2021-10-23 PROCEDURE — 83735 ASSAY OF MAGNESIUM: CPT

## 2021-10-23 PROCEDURE — C1732: CPT

## 2021-10-23 PROCEDURE — 93010 ELECTROCARDIOGRAM REPORT: CPT

## 2021-10-23 PROCEDURE — C1894: CPT

## 2021-10-23 PROCEDURE — C1766: CPT

## 2021-10-23 PROCEDURE — 80048 BASIC METABOLIC PNL TOTAL CA: CPT

## 2021-10-23 PROCEDURE — C1893: CPT

## 2021-10-23 PROCEDURE — C1889: CPT

## 2021-10-23 PROCEDURE — 93662 INTRACARDIAC ECG (ICE): CPT

## 2021-10-23 PROCEDURE — 93005 ELECTROCARDIOGRAM TRACING: CPT

## 2021-10-23 PROCEDURE — C1731: CPT

## 2021-10-23 PROCEDURE — C1759: CPT

## 2021-10-23 PROCEDURE — 85027 COMPLETE CBC AUTOMATED: CPT

## 2021-10-23 PROCEDURE — 93655 ICAR CATH ABLTJ DSCRT ARRHYT: CPT

## 2021-10-23 PROCEDURE — 93623 PRGRMD STIMJ&PACG IV RX NFS: CPT

## 2021-10-23 RX ORDER — PANTOPRAZOLE SODIUM 20 MG/1
1 TABLET, DELAYED RELEASE ORAL
Qty: 58 | Refills: 0
Start: 2021-10-23 | End: 2021-11-05

## 2021-10-23 RX ORDER — SUCRALFATE 1 G
10 TABLET ORAL
Qty: 280 | Refills: 0
Start: 2021-10-23 | End: 2021-11-05

## 2021-10-23 RX ADMIN — Medication 1 GRAM(S): at 06:03

## 2021-10-23 RX ADMIN — PANTOPRAZOLE SODIUM 40 MILLIGRAM(S): 20 TABLET, DELAYED RELEASE ORAL at 06:03

## 2021-10-23 RX ADMIN — LAMOTRIGINE 100 MILLIGRAM(S): 25 TABLET, ORALLY DISINTEGRATING ORAL at 06:03

## 2021-10-23 RX ADMIN — Medication 20 MILLIGRAM(S): at 06:03

## 2021-10-23 RX ADMIN — Medication 50 MILLIGRAM(S): at 06:05

## 2021-10-23 RX ADMIN — LEVETIRACETAM 1500 MILLIGRAM(S): 250 TABLET, FILM COATED ORAL at 06:03

## 2021-10-23 RX ADMIN — LISINOPRIL 20 MILLIGRAM(S): 2.5 TABLET ORAL at 06:03

## 2021-10-23 NOTE — DISCHARGE NOTE NURSING/CASE MANAGEMENT/SOCIAL WORK - PATIENT PORTAL LINK FT
You can access the FollowMyHealth Patient Portal offered by [facility] by registering at the following website: [email or web address]. By joining [employer]’s FollowMyHealth portal, you will also be able to view your health information using other applications (apps) compatible with our system.

## 2021-10-23 NOTE — PROGRESS NOTE ADULT - SUBJECTIVE AND OBJECTIVE BOX
Patient seen today in bed. Figure 8 sutures removed from right and left groin without complication. No overnight complaints.     EKG: SR at 65bpm; QRSD 114ms  TELE: SR. No events. 60s    MEDICATIONS  (STANDING):  furosemide    Tablet 20 milliGRAM(s) Oral daily  lamoTRIgine 100 milliGRAM(s) Oral two times a day  levETIRAcetam 1500 milliGRAM(s) Oral two times a day  lisinopril 20 milliGRAM(s) Oral daily  metoprolol succinate ER 50 milliGRAM(s) Oral daily  pantoprazole    Tablet 40 milliGRAM(s) Oral two times a day  rivaroxaban 20 milliGRAM(s) Oral daily  sucralfate suspension 1 Gram(s) Oral two times a day    MEDICATIONS  (PRN):  acetaminophen     Tablet .. 650 milliGRAM(s) Oral every 6 hours PRN Mild Pain (1 - 3), Moderate Pain (4 - 6)  ALPRAZolam 0.25 milliGRAM(s) Oral every 6 hours PRN anxiety/insomnia  aluminum hydroxide/magnesium hydroxide/simethicone Suspension 30 milliLiter(s) Oral every 4 hours PRN Dyspepsia  benzocaine 15 mG/menthol 3.6 mG (Sugar-Free) Lozenge 1 Lozenge Oral every 2 hours PRN Sore Throat  oxycodone    5 mG/acetaminophen 325 mG 1 Tablet(s) Oral every 6 hours PRN Severe Pain (7 - 10)    Allergies  No Known Allergies    PAST MEDICAL & SURGICAL HISTORY:  HTN (hypertension)  Seizures  last seizure 3 months ago  Concussion  childhood  Prostate cancer  Unspecified atrial fibrillation  S/P brain surgery  1990  H/O prostatectomy  01/2021    Vital Signs Last 24 Hrs  T(C): 36.8 (23 Oct 2021 05:56), Max: 36.8 (23 Oct 2021 05:56)  T(F): 98.3 (23 Oct 2021 05:56), Max: 98.3 (23 Oct 2021 05:56)  HR: 64 (23 Oct 2021 05:56) (63 - 86)  BP: 129/68 (23 Oct 2021 05:56) (106/60 - 143/66)  RR: 15 (23 Oct 2021 05:56) (15 - 18)  SpO2: 99% (23 Oct 2021 05:56) (95% - 100%)    Physical Exam:  Constitutional: NAD, AAOx3  Cardiovascular: +S1S2 RRR  Pulmonary: CTA b/l, unlabored  GI: soft NTND +BS  Extremities: no pedal edema  Right groin: mild ecchymosis but no hematoma  Left groin No ecchymosis or hematoma.   Neuro: non focal, MOORE x4    LABS:                        13.5   9.67  )-----------( 206      ( 23 Oct 2021 07:01 )             40.7     139  |  99  |  15.3  ----------------------------<  117<H>  4.0   |  29.0  |  0.78  Ca    9.9      23 Oct 2021 07:01  Mg     2.1     10-23    A/P  66 year old male with PMH of HTN, prostate cancer s/p prostatectomy, seizure disorder and symptomatic paroxysmal atrial fibrillation. Event monitor 3/23-4/5/21 demonstrated frequent episodes of AF w/rapid rates, longest episode lasting 4 hours and 45 min. He presented electively and is now status post uncomplicated radiofrequency ablation of atrial fibrillation (WACA/PVI, CTI). Resting comfortably.    -  Discharge home today

## 2021-11-11 PROBLEM — S06.0X9A CONCUSSION WITH LOSS OF CONSCIOUSNESS OF UNSPECIFIED DURATION, INITIAL ENCOUNTER: Chronic | Status: ACTIVE | Noted: 2021-10-13

## 2021-11-11 PROBLEM — R56.9 UNSPECIFIED CONVULSIONS: Chronic | Status: ACTIVE | Noted: 2019-09-17

## 2021-11-11 PROBLEM — I48.91 UNSPECIFIED ATRIAL FIBRILLATION: Chronic | Status: ACTIVE | Noted: 2021-10-13

## 2021-11-11 PROBLEM — C61 MALIGNANT NEOPLASM OF PROSTATE: Chronic | Status: ACTIVE | Noted: 2021-10-13

## 2021-11-12 NOTE — REVIEW OF SYSTEMS
[Feeling Fatigued] : feeling fatigued [SOB] : shortness of breath [Palpitations] : palpitations [Cough] : cough [Dizziness] : dizziness [Negative] : Integumentary [Fever] : no fever [Chills] : no chills [Dyspnea on exertion] : not dyspnea during exertion [Chest Discomfort] : no chest discomfort [Leg Claudication] : no intermittent leg claudication [Orthopnea] : no orthopnea [Syncope] : no syncope [Convulsions] : no convulsions [Confusion] : no confusion was observed [Easy Bleeding] : no tendency for easy bleeding [Easy Bruising] : no tendency for easy bruising

## 2021-11-12 NOTE — PHYSICAL EXAM
[Well Developed] : well developed [Well Nourished] : well nourished [No Acute Distress] : no acute distress [Normal Conjunctiva] : normal conjunctiva [Normal Venous Pressure] : normal venous pressure [No Carotid Bruit] : no carotid bruit [Normal S1, S2] : normal S1, S2 [No Murmur] : no murmur [No Rub] : no rub [No Gallop] : no gallop [Good Air Entry] : good air entry [No Respiratory Distress] : no respiratory distress  [Soft] : abdomen soft [Normal Gait] : normal gait [No Cyanosis] : no cyanosis [No Clubbing] : no clubbing [No Varicosities] : no varicosities [No Rash] : no rash [No Skin Lesions] : no skin lesions [Moves all extremities] : moves all extremities [No Focal Deficits] : no focal deficits [Normal Speech] : normal speech [Alert and Oriented] : alert and oriented [Normal memory] : normal memory [de-identified] : frequent ectopy noted on exam [de-identified] : rales at bases bilaterally [de-identified] : 2+ LLE edema, 1+ RLE

## 2021-11-12 NOTE — CARDIOLOGY SUMMARY
[de-identified] : 5/19/21 sinus rhythm 60 bpm, first degree AVB  ms, narrow QRS [de-identified] : ETT 1/8/19 neg for ischemia, or induced arrhythmia  [de-identified] : TTE 2/26/21 LVEF 70%, LA 4.1cm, trace MR, trace TR, RVSP 32 \par \par

## 2021-11-12 NOTE — DISCUSSION/SUMMARY
[FreeTextEntry1] : 65 year old gentleman with history of paroxysmal AF, prostate cancer, seizure disorder, presenting for evaluation of atrial fibrillation. He has had frequent episodes of paroxysmal AF with rapid rates, and has had progressive symptoms and progressively frequent arrhythmia (9% burden on recent monitoring) and now has developed symptoms of CHF with dyspnea, cough and LE edema which I suspect may be due to progressively frequent arrhythmia/tachycardia. We discussed AF in detail, and management options including medical therapy, antiarrhythmic medications, and catheter ablation. He strongly desires to avoid additional long-term medications, and wants to proceed with AF ablation as soon as possible. The risks and benefits of AF ablation were reviewed, including procedure related risks such as bleeding, vascular injury, cardiac perforation, stroke and esophageal injury. He expressed understanding, and as part of a shared decision making process he does want to proceed ASAP.  \par \par He has a CHADSVASc of 1, but will require anticoagulation bertin-procedurally at least. We discussed risks and benefits of anticoagulation, and that would reconsider need for log-term anticoagulation pending followup after the ablation.  \par \par Given his symptoms of CHF, will check CXR and start diuresis. He will then have follow-up next week with Dr. Romano. \par \par -Start Xarelto 20mg qhs (was informed Eliquis not covered by insurance). Can stop ASA when starting this.  \par \par -plan for AF ablation. CAIO pre ablation.  \par \par -given asymmetric LE edema, will get LE doppler to rule out DVT. Starting anticoagulation regardless.  \par \par -start lasix 20mg qd. Will get CMP in 1 week \par \par -check CXR \par \par -fu with Dr Romano next week, and EP followup thereafter. \par

## 2021-11-12 NOTE — HISTORY OF PRESENT ILLNESS
Last ov 02/26/21   [FreeTextEntry1] : 65 year old gentleman with history of paroxysmal AF, prostate cancer, seizure disorder, presenting for evaluation of atrial fibrillation.  \par \par He has a history of periodic palpitation, and ultimately found to have paroxysmal atrial fibrillation with rapid rates. Recently he has had much more frequent palpitation and longer lasting episodes. With this, he has felt dyspnea, LE edema, and lightheadedness with occasional near syncope. Recently he has also developed a new cough in the setting of mor dyspnea, and has noted progressive LE edema (LLE>>RLE) \par \par An event monitor 3/23 – 4/5/21 revealed frequent episodes of AF with rapid rates (9% burden) with avg rate 93 bpm but up to 177 bpm. The longest AF episodes lasted 4 hours and 45 mins. \par \par He has been taking ASA 81mg qd, and has been started on rate control with Toprol 50mg qd,. He is also on antiepileptic medication with levetiracetam. \par \par A TTE in 2/2021 revealed normal LV function.

## 2021-11-16 DIAGNOSIS — Z82.49 FAMILY HISTORY OF ISCHEMIC HEART DISEASE AND OTHER DISEASES OF THE CIRCULATORY SYSTEM: ICD-10-CM

## 2021-11-16 DIAGNOSIS — Z87.898 PERSONAL HISTORY OF OTHER SPECIFIED CONDITIONS: ICD-10-CM

## 2021-11-16 DIAGNOSIS — Z85.46 PERSONAL HISTORY OF MALIGNANT NEOPLASM OF PROSTATE: ICD-10-CM

## 2021-11-16 DIAGNOSIS — I10 ESSENTIAL (PRIMARY) HYPERTENSION: ICD-10-CM

## 2021-11-16 DIAGNOSIS — Z80.42 FAMILY HISTORY OF MALIGNANT NEOPLASM OF PROSTATE: ICD-10-CM

## 2021-11-16 DIAGNOSIS — Z84.89 FAMILY HISTORY OF OTHER SPECIFIED CONDITIONS: ICD-10-CM

## 2021-11-16 RX ORDER — FUROSEMIDE 20 MG/1
20 TABLET ORAL DAILY
Qty: 30 | Refills: 3 | Status: ACTIVE | COMMUNITY
Start: 2021-11-16

## 2021-11-16 RX ORDER — LAMOTRIGINE 100 MG/1
100 TABLET ORAL TWICE DAILY
Refills: 0 | Status: ACTIVE | COMMUNITY
Start: 2021-11-16

## 2021-11-16 RX ORDER — LEVETIRACETAM 500 MG/1
500 TABLET, FILM COATED ORAL TWICE DAILY
Refills: 0 | Status: ACTIVE | COMMUNITY
Start: 2021-11-16

## 2021-11-16 RX ORDER — METOPROLOL SUCCINATE 50 MG/1
50 TABLET, EXTENDED RELEASE ORAL DAILY
Qty: 90 | Refills: 1 | Status: ACTIVE | COMMUNITY
Start: 2021-11-16

## 2021-11-16 RX ORDER — RIVAROXABAN 20 MG/1
20 TABLET, FILM COATED ORAL
Qty: 30 | Refills: 0 | Status: ACTIVE | COMMUNITY
Start: 2021-11-16

## 2021-11-16 RX ORDER — LISINOPRIL 20 MG/1
20 TABLET ORAL
Qty: 90 | Refills: 3 | Status: ACTIVE | COMMUNITY
Start: 2021-11-16

## 2021-11-16 RX ORDER — LAMOTRIGINE 25 MG/1
25 TABLET ORAL TWICE DAILY
Refills: 0 | Status: ACTIVE | COMMUNITY
Start: 2021-11-16

## 2021-11-17 ENCOUNTER — APPOINTMENT (OUTPATIENT)
Dept: ELECTROPHYSIOLOGY | Facility: CLINIC | Age: 66
End: 2021-11-17
Payer: MEDICARE

## 2021-11-17 VITALS
BODY MASS INDEX: 37.08 KG/M2 | HEIGHT: 70 IN | HEART RATE: 57 BPM | SYSTOLIC BLOOD PRESSURE: 126 MMHG | DIASTOLIC BLOOD PRESSURE: 66 MMHG | WEIGHT: 259 LBS

## 2021-11-17 PROCEDURE — 99215 OFFICE O/P EST HI 40 MIN: CPT

## 2021-11-17 PROCEDURE — 93000 ELECTROCARDIOGRAM COMPLETE: CPT

## 2021-11-17 NOTE — HISTORY OF PRESENT ILLNESS
[FreeTextEntry1] : 65 year old gentleman with history of paroxysmal AF, prostate cancer, seizure disorder, presenting for follow-up of atrial fibrillation s/p recent AF ablation 10/22/21.  \par \par He has a history of periodic palpitation, and ultimately found to have paroxysmal atrial fibrillation with rapid rates. Recently he progressively frequent palpitation and longer lasting episodes. With this, he has felt dyspnea, LE edema, and lightheadedness with occasional near syncope.  \par \par An event monitor 3/23 – 4/5/21 revealed frequent episodes of AF with rapid rates (9% burden) with avg rate 93 bpm but up to 177 bpm. The longest AF episodes lasted 4 hours and 45 mins. \par \par He had been on ASA 81mg qd, and has been started on rate control with Toprol 50mg qd. After last visit he was started on anticoagulation with Xarelto. \par \par On 10/22/21 he underwent AF Ablation, which included PVI And CTI line. He was continued on Toprol and Xarelto after the ablation. \par \par On follow-up he is feeling very well, and has noted a marked improvement since the ablation. He denies any recurrent palpitation, dyspnea, edema, or dizziness.  \par \par ECG today reveal sinus rhythm 57 bpm with narrow QRS and first degree AVB ( ms).

## 2021-11-17 NOTE — CARDIOLOGY SUMMARY
[de-identified] : 11/17/21 sinus rhythm 57 bpm, first degree AVB  ms, narrow QR\par 5/19/21 sinus rhythm 60 bpm, first degree AVB  ms, narrow QRS [de-identified] : ETT 1/8/19 neg for ischemia, or induced arrhythmia  [de-identified] : TTE 2/26/21 LVEF 70%, LA 4.1cm, trace MR, trace TR, RVSP 32 \par \par

## 2021-11-17 NOTE — DISCUSSION/SUMMARY
[FreeTextEntry1] : 65 year old gentleman with history of paroxysmal AF, prostate cancer, seizure disorder, presenting for follow-up of atrial fibrillation s/p recent AF ablation 10/22/21. He has been doing very well since the AF ablation and noted marked symptomatic improvement. He has a CHADSVASc=1 and is hopeful to avoid long-term anticoagulation. We discussed monitoring to ensure absence of recurrent AF prior to stopping anticoagulation, and he would prefer an ILR implant- which I agree would be most useful for long-term monitoring and guide need for further anticoagulation in the future.  \par \par -Continue Xarelto for at least 2 more months.  \par \par -ILR implant. If no recurrent AF after that time will stop anticoagulation.  \par \par -can decrease Toprol to 25mg qd. He has mild sinus bradycardia and first degree AVB. Can consider weaning this off completely in the future.  \par \par -finish PPI until 6 weeks post ablation for post-procedure GI prophylaxis.

## 2021-11-17 NOTE — REVIEW OF SYSTEMS
[Fever] : no fever [Chills] : no chills [Feeling Fatigued] : feeling fatigued [SOB] : shortness of breath [Dyspnea on exertion] : not dyspnea during exertion [Chest Discomfort] : no chest discomfort [Leg Claudication] : no intermittent leg claudication [Palpitations] : palpitations [Orthopnea] : no orthopnea [Syncope] : no syncope [Cough] : cough [Dizziness] : dizziness [Convulsions] : no convulsions [Confusion] : no confusion was observed [Easy Bleeding] : no tendency for easy bleeding [Easy Bruising] : no tendency for easy bruising [Negative] : Integumentary

## 2021-11-17 NOTE — PHYSICAL EXAM
[Well Developed] : well developed [Well Nourished] : well nourished [No Acute Distress] : no acute distress [Normal Conjunctiva] : normal conjunctiva [Normal Venous Pressure] : normal venous pressure [No Carotid Bruit] : no carotid bruit [Normal S1, S2] : normal S1, S2 [No Murmur] : no murmur [No Rub] : no rub [No Gallop] : no gallop [Clear Lung Fields] : clear lung fields [Good Air Entry] : good air entry [No Respiratory Distress] : no respiratory distress  [Soft] : abdomen soft [Normal Gait] : normal gait [No Cyanosis] : no cyanosis [No Clubbing] : no clubbing [No Varicosities] : no varicosities [No Rash] : no rash [No Skin Lesions] : no skin lesions [Moves all extremities] : moves all extremities [No Focal Deficits] : no focal deficits [Normal Speech] : normal speech [Alert and Oriented] : alert and oriented [Normal memory] : normal memory [de-identified] : trace LE edema

## 2021-11-19 RX ORDER — PANTOPRAZOLE 40 MG/1
40 TABLET, DELAYED RELEASE ORAL
Qty: 58 | Refills: 0 | Status: ACTIVE | COMMUNITY
Start: 2021-10-23

## 2021-11-19 RX ORDER — SUCRALFATE 1 G/10ML
1 SUSPENSION ORAL
Qty: 280 | Refills: 0 | Status: ACTIVE | COMMUNITY
Start: 2021-10-23

## 2021-11-19 RX ORDER — LEVETIRACETAM 500 MG/1
500 TABLET, FILM COATED, EXTENDED RELEASE ORAL
Qty: 540 | Refills: 0 | Status: DISCONTINUED | COMMUNITY
Start: 2020-12-28

## 2021-12-07 ENCOUNTER — APPOINTMENT (OUTPATIENT)
Dept: CARDIOLOGY | Facility: CLINIC | Age: 66
End: 2021-12-07
Payer: MEDICARE

## 2021-12-07 DIAGNOSIS — I89.0 LYMPHEDEMA, NOT ELSEWHERE CLASSIFIED: ICD-10-CM

## 2021-12-07 PROCEDURE — 99214 OFFICE O/P EST MOD 30 MIN: CPT

## 2021-12-08 NOTE — REASON FOR VISIT
[Initial Evaluation] : an initial evaluation of [FreeTextEntry2] : Left leg swelling [FreeTextEntry1] : 66 year old male, here with his daughter\par Prostate CA with prostatecomy in January 2021.  Post op with 2 drains in the groins in spring of 2021 daughter noticed more swelling in the left leg.  L >>R.  no change with diuretics.  he does not wear compression garments.  The left leg feels full and achy.  Progressive throughout the day.  No arterial dz on duplex.  strong pedal pulses on exam.  Venous duplex in the past negative.  Now on Xarelto for history of afib ablation (doubt DVT).  Leg was NOT swollen prior to prostate sx.  \par \par RIGHT LOWER EXTREMITY...No evidence of hemodynamically significant stenosis of the right lower extremity. \par ...There were no calcifications noted within the arterial tree. \par \par LEFT LOWER EXTREMITY...No evidence of hemodynamically significant stenosis of the left lower extremity. \par ...There were no calcifications noted within the arterial tree. \par

## 2021-12-08 NOTE — PHYSICAL EXAM
[General Appearance - Well Developed] : well developed [Normal Appearance] : normal appearance [Well Groomed] : well groomed [General Appearance - Well Nourished] : well nourished [No Deformities] : no deformities [General Appearance - In No Acute Distress] : no acute distress [Normal Conjunctiva] : the conjunctiva exhibited no abnormalities [Eyelids - No Xanthelasma] : the eyelids demonstrated no xanthelasmas [Normal Oral Mucosa] : normal oral mucosa [No Oral Pallor] : no oral pallor [No Oral Cyanosis] : no oral cyanosis [Normal Jugular Venous A Waves Present] : normal jugular venous A waves present [Normal Jugular Venous V Waves Present] : normal jugular venous V waves present [No Jugular Venous Sierra A Waves] : no jugular venous sierra A waves [Heart Rate And Rhythm] : heart rate and rhythm were normal [Heart Sounds] : normal S1 and S2 [Murmurs] : no murmurs present [Respiration, Rhythm And Depth] : normal respiratory rhythm and effort [Exaggerated Use Of Accessory Muscles For Inspiration] : no accessory muscle use [Auscultation Breath Sounds / Voice Sounds] : lungs were clear to auscultation bilaterally [Abdomen Soft] : soft [Abdomen Tenderness] : non-tender [] : no hepato-splenomegaly [Abdomen Mass (___ Cm)] : no abdominal mass palpated [Abnormal Walk] : normal gait [Gait - Sufficient For Exercise Testing] : the gait was sufficient for exercise testing [FreeTextEntry1] : Left lower extremity edema 2+.  Strong pedal pulses.  no ulceration.  no clear varicose veins on exam.

## 2021-12-08 NOTE — ASSESSMENT
[FreeTextEntry1] : Assessment:\par 1.  Left lower extremity edema\par - started post prostate Ca surgery\par - no DVT\par - right not affected\par 2.  Afib on xarelto\par \par Plan\par 1.  I think this is a form of lymphedema/ lymphatic issue that occurred post operatively.  He describes having drains in post prostate sugery - perhaps the lymphatics were affected.\par 2.  Start 20-30 mmHg compression garments, to be put on in the morning and taken off in PM\par 3.  Would assess for proximal venous obstruction with MR - venogram pelvis.  \par 4.  Return in 3 months for followup\par 5.  We discussed lymphedema therapy and pneumatic pump - not yet. \par \par

## 2022-01-19 ENCOUNTER — APPOINTMENT (OUTPATIENT)
Dept: MRI IMAGING | Facility: CLINIC | Age: 67
End: 2022-01-19

## 2022-01-24 ENCOUNTER — TRANSCRIPTION ENCOUNTER (OUTPATIENT)
Age: 67
End: 2022-01-24

## 2022-01-24 ENCOUNTER — OUTPATIENT (OUTPATIENT)
Dept: OUTPATIENT SERVICES | Facility: HOSPITAL | Age: 67
LOS: 1 days | Discharge: HOME | End: 2022-01-24
Payer: MEDICARE

## 2022-01-24 VITALS — SYSTOLIC BLOOD PRESSURE: 107 MMHG | TEMPERATURE: 98 F | HEART RATE: 60 BPM | DIASTOLIC BLOOD PRESSURE: 62 MMHG

## 2022-01-24 DIAGNOSIS — I49.9 CARDIAC ARRHYTHMIA, UNSPECIFIED: ICD-10-CM

## 2022-01-24 DIAGNOSIS — Z90.79 ACQUIRED ABSENCE OF OTHER GENITAL ORGAN(S): Chronic | ICD-10-CM

## 2022-01-24 DIAGNOSIS — I48.91 UNSPECIFIED ATRIAL FIBRILLATION: ICD-10-CM

## 2022-01-24 DIAGNOSIS — Z98.890 OTHER SPECIFIED POSTPROCEDURAL STATES: Chronic | ICD-10-CM

## 2022-01-24 PROCEDURE — C1764: CPT

## 2022-01-24 PROCEDURE — 33285 INSJ SUBQ CAR RHYTHM MNTR: CPT

## 2022-01-24 RX ORDER — RIVAROXABAN 15 MG-20MG
1 KIT ORAL
Qty: 0 | Refills: 0 | DISCHARGE

## 2022-01-24 RX ORDER — LEVETIRACETAM 250 MG/1
3 TABLET, FILM COATED ORAL
Qty: 0 | Refills: 0 | DISCHARGE

## 2022-01-24 RX ORDER — FUROSEMIDE 40 MG
1 TABLET ORAL
Qty: 0 | Refills: 0 | DISCHARGE

## 2022-01-24 RX ORDER — METOPROLOL TARTRATE 50 MG
1 TABLET ORAL
Qty: 0 | Refills: 0 | DISCHARGE

## 2022-01-24 RX ORDER — LISINOPRIL 2.5 MG/1
1 TABLET ORAL
Qty: 0 | Refills: 0 | DISCHARGE

## 2022-01-24 RX ORDER — LAMOTRIGINE 25 MG/1
2 TABLET, ORALLY DISINTEGRATING ORAL
Qty: 0 | Refills: 0 | DISCHARGE

## 2022-01-24 RX ORDER — LAMOTRIGINE 25 MG/1
1 TABLET, ORALLY DISINTEGRATING ORAL
Qty: 0 | Refills: 0 | DISCHARGE

## 2022-01-24 NOTE — H&P PST ADULT - NSICDXPASTMEDICALHX_GEN_ALL_CORE_FT
PAST MEDICAL HISTORY:  Concussion childhood    HTN (hypertension)     Prostate cancer     Seizures last seizure 3 months ago    Unspecified atrial fibrillation

## 2022-01-24 NOTE — ASU DISCHARGE PLAN (ADULT/PEDIATRIC) - COMMENTS
Follow up with Dr. Wiseman/Benita Arthur NP at [facility] in 2-4 weeks. Our office will contact you in 3-5 days to schedule this appointment. Please call [phone] with questions or concerns.

## 2022-01-24 NOTE — H&P PST ADULT - ASSESSMENT
66 year old male with PMH of HTN, prostate cancer s/p prostatectomy, seizure disorder and symptomatic paroxysmal atrial fibrillation status post ablation 10/23/22 (WACA/PVI, CTI). He states he has done well post ablation, and has only rare palpitations, usually associated with getting excited over sports. He now presents for ILR implant for longitudinal rhythm monitoring to guide further management.    Plan:   The risks and benefits of, and alternatives to the planned procedure were discussed with the patient. Risks including, but not limited to: bleeding, infection, stroke, heart attack, needing emergency surgery and death, were reviewed. All questions were answered to the patient's expressed satisfaction and informed consent was obtained.   Chest prep per protocol.   D/C teaching initiated.

## 2022-01-24 NOTE — ASU DISCHARGE PLAN (ADULT/PEDIATRIC) - NS MD DC FALL RISK RISK
For information on Fall & Injury Prevention, visit: [email or web address] OR  [email or web address] OR  https://www.cdc.gov/steadi/patient.html

## 2022-01-24 NOTE — DISCHARGE NOTE NURSING/CASE MANAGEMENT/SOCIAL WORK - PATIENT PORTAL LINK FT
You can access the FollowMyHealth Patient Portal offered by [facility] by registering at the following website: [email or web address]. By joining [facility]’s FollowMyHealth portal, you will also be able to view your health information using other applications (apps) compatible with our system.

## 2022-01-24 NOTE — H&P PST ADULT - HISTORY OF PRESENT ILLNESS
66 year old male with PMH of HTN, prostate cancer s/p prostatectomy, seizure disorder and symptomatic paroxysmal atrial fibrillation status post ablation 10/23/22 (WACA/PVI, CTI). He states he has done well post ablation, and has only rare palpitations, usually associated with getting excited over sports. He now presents for ILR implant for longitudinal rhythm monitoring to guide further management. Patient currently denies chest pain, shortness of breath at rest or with exertion, near/true syncope, fevers/chills, N/V/D, or other cardiac or constitutional symptoms.  66 year old male with PMH of HTN, prostate cancer s/p prostatectomy, seizure disorder and symptomatic paroxysmal atrial fibrillation status post ablation 10/23/21 (WACA/PVI, CTI). He states he has done well post ablation, and has only rare palpitations, usually associated with getting excited over sports. He now presents for ILR implant for longitudinal rhythm monitoring to guide further management. Patient currently denies chest pain, shortness of breath at rest or with exertion, near/true syncope, fevers/chills, N/V/D, or other cardiac or constitutional symptoms.

## 2022-01-24 NOTE — ASU DISCHARGE PLAN (ADULT/PEDIATRIC) - CARE PROVIDER_API CALL
Unruly Wiseman)  Cardiology; Internal Medicine  [address], [address]  [place] [address]  Phone: [phone]  Fax: [phone]  Follow Up Time:     Marcello Mccoy)  Cardiovascular Disease; Internal Medicine  [facility], [address], [address]  [place] [address]  Phone: [phone]  Fax: [phone]  Follow Up Time:

## 2022-01-24 NOTE — DISCHARGE NOTE NURSING/CASE MANAGEMENT/SOCIAL WORK - NSDCPEFALRISK_GEN_ALL_CORE
For information on Fall & Injury Prevention, visit: [email or web address] OR  [email or web address] OR  https://www.cdc.gov/steadi/patient.html

## 2022-05-27 NOTE — H&P PST ADULT - OTHER CARE PROVIDERS
PPD Reading Note  Result: 0 mm induration.  Interpretation: Negative  Allergic reaction: no     PCP Jonathan Servin

## 2022-09-02 ENCOUNTER — APPOINTMENT (OUTPATIENT)
Dept: ORTHOPEDIC SURGERY | Facility: CLINIC | Age: 67
End: 2022-09-02

## 2022-09-02 VITALS — HEIGHT: 70 IN | WEIGHT: 258 LBS | BODY MASS INDEX: 36.94 KG/M2

## 2022-09-02 DIAGNOSIS — M54.6 PAIN IN THORACIC SPINE: ICD-10-CM

## 2022-09-02 DIAGNOSIS — M47.814 SPONDYLOSIS W/OUT MYELOPATHY OR RADICULOPATHY, THORACIC REGION: ICD-10-CM

## 2022-09-02 PROCEDURE — 72070 X-RAY EXAM THORAC SPINE 2VWS: CPT

## 2022-09-02 PROCEDURE — 99213 OFFICE O/P EST LOW 20 MIN: CPT

## 2022-09-02 NOTE — PHYSICAL EXAM
[Bending to left] : bending to left [Bending to right] : bending to right [Bilateral] : rib bilaterally [Facet arthropathy] : Facet arthropathy [Disc space narrowing] : Disc space narrowing [] : negative sitting straight leg raise [FreeTextEntry8] : tender lower medial border scapula region [FreeTextEntry1] : extensive bridging osteophytes c/w spondylosis

## 2023-10-10 NOTE — H&P PST ADULT - NEGATIVE GENERAL GENITOURINARY SYMPTOMS
06-Oct-2023 08:59 06-Oct-2023 20:35 06-Oct-2023 15:10 06-Oct-2023 17:58 08-Oct-2023 19:17 10-Oct-2023 13:08 06-Oct-2023 no hematuria/no renal colic/no flank pain L/no flank pain R

## 2024-11-05 ENCOUNTER — APPOINTMENT (OUTPATIENT)
Dept: CARDIOLOGY | Facility: CLINIC | Age: 69
End: 2024-11-05
Payer: MEDICARE

## 2024-11-05 DIAGNOSIS — I83.90 ASYMPTOMATIC VARICOSE VEINS OF UNSPECIFIED LOWER EXTREMITY: ICD-10-CM

## 2024-11-05 PROCEDURE — 99213 OFFICE O/P EST LOW 20 MIN: CPT

## 2025-03-06 ENCOUNTER — APPOINTMENT (OUTPATIENT)
Dept: VASCULAR SURGERY | Facility: CLINIC | Age: 70
End: 2025-03-06